# Patient Record
Sex: FEMALE | Race: WHITE | NOT HISPANIC OR LATINO | Employment: OTHER | URBAN - METROPOLITAN AREA
[De-identification: names, ages, dates, MRNs, and addresses within clinical notes are randomized per-mention and may not be internally consistent; named-entity substitution may affect disease eponyms.]

---

## 2023-03-21 PROBLEM — H55.09 HORIZONTAL NYSTAGMUS: Status: ACTIVE | Noted: 2023-03-21

## 2023-03-21 PROBLEM — R42 DIZZINESS: Status: ACTIVE | Noted: 2023-03-21

## 2023-03-21 PROBLEM — R92.1 BREAST CALCIFICATION, RIGHT: Status: ACTIVE | Noted: 2023-03-21

## 2023-03-21 PROBLEM — R42 VERTIGO: Status: ACTIVE | Noted: 2023-03-21

## 2023-03-21 PROBLEM — M25.562 LEFT KNEE PAIN: Status: ACTIVE | Noted: 2023-03-21

## 2023-03-21 PROBLEM — G43.909 MIGRAINES: Status: ACTIVE | Noted: 2023-03-21

## 2023-03-21 PROBLEM — N95.2 ATROPHIC VULVOVAGINITIS: Status: ACTIVE | Noted: 2023-03-21

## 2023-03-21 PROBLEM — M26.69 TMJ CREPITUS: Status: ACTIVE | Noted: 2023-03-21

## 2023-03-21 PROBLEM — H93.8X2 FULLNESS IN EAR, LEFT: Status: ACTIVE | Noted: 2023-03-21

## 2023-03-21 PROBLEM — F17.210 NICOTINE DEPENDENCE, CIGARETTES, UNCOMPLICATED: Status: ACTIVE | Noted: 2023-03-21

## 2023-03-21 PROBLEM — H92.02 OTALGIA, LEFT: Status: ACTIVE | Noted: 2023-03-21

## 2023-03-21 PROBLEM — K63.5 COLON POLYPS: Status: ACTIVE | Noted: 2023-03-21

## 2023-03-21 PROBLEM — R20.0 NUMBNESS AND TINGLING OF RIGHT HAND: Status: ACTIVE | Noted: 2023-03-21

## 2023-03-21 PROBLEM — E78.00 ELEVATED LOW DENSITY LIPOPROTEIN (LDL) CHOLESTEROL LEVEL: Status: ACTIVE | Noted: 2023-03-21

## 2023-03-21 PROBLEM — R11.0 NAUSEA: Status: ACTIVE | Noted: 2023-03-21

## 2023-03-21 PROBLEM — E78.00 HYPERCHOLESTEROLEMIA: Status: ACTIVE | Noted: 2023-03-21

## 2023-03-21 PROBLEM — R20.2 NUMBNESS AND TINGLING OF RIGHT HAND: Status: ACTIVE | Noted: 2023-03-21

## 2023-03-21 PROBLEM — H90.3 ASYMMETRICAL SENSORINEURAL HEARING LOSS: Status: ACTIVE | Noted: 2023-03-21

## 2023-03-21 PROBLEM — M11.262 CHONDROCALCINOSIS OF LEFT KNEE: Status: ACTIVE | Noted: 2023-03-21

## 2023-03-21 PROBLEM — N20.0 KIDNEY STONE: Status: ACTIVE | Noted: 2023-03-21

## 2023-03-21 PROBLEM — M85.80 OSTEOPENIA: Status: ACTIVE | Noted: 2023-03-21

## 2023-03-21 PROBLEM — G95.0: Status: ACTIVE | Noted: 2023-03-21

## 2023-03-21 PROBLEM — R00.2 PALPITATIONS: Status: ACTIVE | Noted: 2023-03-21

## 2023-03-21 PROBLEM — H81.8X2 UNILATERAL VESTIBULAR WEAKNESS, LEFT: Status: ACTIVE | Noted: 2023-03-21

## 2023-03-21 PROBLEM — M54.2 NECK PAIN: Status: ACTIVE | Noted: 2023-03-21

## 2023-03-21 PROBLEM — R79.89 ABNORMAL TSH: Status: ACTIVE | Noted: 2023-03-21

## 2023-03-21 PROBLEM — N76.1 DESQUAMATIVE INFLAMMATORY VAGINITIS: Status: ACTIVE | Noted: 2023-03-21

## 2023-03-21 PROBLEM — R92.8 ABNORMAL MAMMOGRAM: Status: ACTIVE | Noted: 2023-03-21

## 2023-03-21 PROBLEM — M76.51 PATELLAR TENDINITIS OF RIGHT KNEE: Status: ACTIVE | Noted: 2023-03-21

## 2023-03-21 PROBLEM — R91.1 INCIDENTAL LUNG NODULE: Status: ACTIVE | Noted: 2023-03-21

## 2023-03-21 PROBLEM — D69.6 THROMBOCYTOPENIA (CMS-HCC): Status: ACTIVE | Noted: 2023-03-21

## 2023-03-21 PROBLEM — R63.4 ABNORMAL WEIGHT LOSS: Status: ACTIVE | Noted: 2023-03-21

## 2023-03-21 PROBLEM — F41.9 ANXIETY: Status: ACTIVE | Noted: 2023-03-21

## 2023-03-21 PROBLEM — E55.9 VITAMIN D DEFICIENCY: Status: ACTIVE | Noted: 2023-03-21

## 2023-03-21 PROBLEM — E56.0 VITAMIN E DEFICIENCY: Status: ACTIVE | Noted: 2023-03-21

## 2023-03-21 PROBLEM — R31.29 OTHER MICROSCOPIC HEMATURIA: Status: ACTIVE | Noted: 2023-03-21

## 2023-03-21 PROBLEM — H55.00 NYSTAGMUS: Status: ACTIVE | Noted: 2023-03-21

## 2023-03-21 RX ORDER — SERTRALINE HYDROCHLORIDE 25 MG/1
1 TABLET, FILM COATED ORAL DAILY
COMMUNITY
Start: 2019-07-31 | End: 2023-11-20 | Stop reason: SDUPTHER

## 2023-03-21 RX ORDER — PNV NO.95/FERROUS FUM/FOLIC AC 28MG-0.8MG
1 TABLET ORAL DAILY
COMMUNITY
Start: 2019-07-24 | End: 2023-03-23

## 2023-03-21 RX ORDER — ATORVASTATIN CALCIUM 40 MG/1
1 TABLET, FILM COATED ORAL DAILY
COMMUNITY
Start: 2018-12-17 | End: 2023-09-28 | Stop reason: SDUPTHER

## 2023-03-21 RX ORDER — VIT C/E/ZN/COPPR/LUTEIN/ZEAXAN 250MG-90MG
1 CAPSULE ORAL DAILY
COMMUNITY

## 2023-03-21 RX ORDER — DOXEPIN HYDROCHLORIDE 10 MG/1
1-2 CAPSULE ORAL NIGHTLY PRN
COMMUNITY
Start: 2014-04-23 | End: 2023-03-23 | Stop reason: SDUPTHER

## 2023-03-21 RX ORDER — ESTRADIOL 0.1 MG/G
CREAM VAGINAL
COMMUNITY
End: 2023-03-23

## 2023-03-21 RX ORDER — ACETAMINOPHEN 500 MG
1 TABLET ORAL DAILY
COMMUNITY
End: 2023-03-23

## 2023-03-23 ENCOUNTER — OFFICE VISIT (OUTPATIENT)
Dept: PRIMARY CARE | Facility: CLINIC | Age: 63
End: 2023-03-23
Payer: COMMERCIAL

## 2023-03-23 ENCOUNTER — LAB (OUTPATIENT)
Dept: LAB | Facility: LAB | Age: 63
End: 2023-03-23
Payer: COMMERCIAL

## 2023-03-23 VITALS
SYSTOLIC BLOOD PRESSURE: 118 MMHG | WEIGHT: 154.6 LBS | HEIGHT: 66 IN | OXYGEN SATURATION: 98 % | TEMPERATURE: 97.5 F | HEART RATE: 72 BPM | DIASTOLIC BLOOD PRESSURE: 72 MMHG | BODY MASS INDEX: 24.85 KG/M2

## 2023-03-23 DIAGNOSIS — G43.809 OTHER MIGRAINE WITHOUT STATUS MIGRAINOSUS, NOT INTRACTABLE: ICD-10-CM

## 2023-03-23 DIAGNOSIS — R07.89 ATYPICAL CHEST PAIN: Primary | ICD-10-CM

## 2023-03-23 DIAGNOSIS — R79.89 ABNORMAL TSH: ICD-10-CM

## 2023-03-23 DIAGNOSIS — D69.6 THROMBOCYTOPENIA (CMS-HCC): ICD-10-CM

## 2023-03-23 DIAGNOSIS — E55.9 VITAMIN D DEFICIENCY: ICD-10-CM

## 2023-03-23 DIAGNOSIS — R07.89 ATYPICAL CHEST PAIN: ICD-10-CM

## 2023-03-23 DIAGNOSIS — F51.01 PRIMARY INSOMNIA: ICD-10-CM

## 2023-03-23 DIAGNOSIS — H69.92 DYSFUNCTION OF LEFT EUSTACHIAN TUBE: ICD-10-CM

## 2023-03-23 DIAGNOSIS — M94.0 COSTOCHONDRITIS: ICD-10-CM

## 2023-03-23 DIAGNOSIS — E78.00 HYPERCHOLESTEROLEMIA: ICD-10-CM

## 2023-03-23 LAB
ALANINE AMINOTRANSFERASE (SGPT) (U/L) IN SER/PLAS: 24 U/L (ref 7–45)
ALBUMIN (G/DL) IN SER/PLAS: 4.4 G/DL (ref 3.4–5)
ALKALINE PHOSPHATASE (U/L) IN SER/PLAS: 79 U/L (ref 33–136)
ANION GAP IN SER/PLAS: 8 MMOL/L (ref 10–20)
ASPARTATE AMINOTRANSFERASE (SGOT) (U/L) IN SER/PLAS: 24 U/L (ref 9–39)
BILIRUBIN TOTAL (MG/DL) IN SER/PLAS: 0.6 MG/DL (ref 0–1.2)
CALCIDIOL (25 OH VITAMIN D3) (NG/ML) IN SER/PLAS: 37 NG/ML
CALCIUM (MG/DL) IN SER/PLAS: 9.7 MG/DL (ref 8.6–10.6)
CARBON DIOXIDE, TOTAL (MMOL/L) IN SER/PLAS: 31 MMOL/L (ref 21–32)
CHLORIDE (MMOL/L) IN SER/PLAS: 107 MMOL/L (ref 98–107)
CHOLESTEROL (MG/DL) IN SER/PLAS: 151 MG/DL (ref 0–199)
CHOLESTEROL IN HDL (MG/DL) IN SER/PLAS: 83 MG/DL
CHOLESTEROL/HDL RATIO: 1.8
CREATINE KINASE (U/L) IN SER/PLAS: 90 U/L (ref 0–215)
CREATININE (MG/DL) IN SER/PLAS: 0.76 MG/DL (ref 0.5–1.05)
ERYTHROCYTE DISTRIBUTION WIDTH (RATIO) BY AUTOMATED COUNT: 14.5 % (ref 11.5–14.5)
ERYTHROCYTE MEAN CORPUSCULAR HEMOGLOBIN CONCENTRATION (G/DL) BY AUTOMATED: 31.1 G/DL (ref 32–36)
ERYTHROCYTE MEAN CORPUSCULAR VOLUME (FL) BY AUTOMATED COUNT: 93 FL (ref 80–100)
ERYTHROCYTES (10*6/UL) IN BLOOD BY AUTOMATED COUNT: 4.3 X10E12/L (ref 4–5.2)
GFR FEMALE: 89 ML/MIN/1.73M2
GLUCOSE (MG/DL) IN SER/PLAS: 88 MG/DL (ref 74–99)
HEMATOCRIT (%) IN BLOOD BY AUTOMATED COUNT: 39.9 % (ref 36–46)
HEMOGLOBIN (G/DL) IN BLOOD: 12.4 G/DL (ref 12–16)
LDL: 54 MG/DL (ref 0–99)
LEUKOCYTES (10*3/UL) IN BLOOD BY AUTOMATED COUNT: 4.1 X10E9/L (ref 4.4–11.3)
NRBC (PER 100 WBCS) BY AUTOMATED COUNT: 0 /100 WBC (ref 0–0)
PLATELETS (10*3/UL) IN BLOOD AUTOMATED COUNT: 132 X10E9/L (ref 150–450)
POTASSIUM (MMOL/L) IN SER/PLAS: 4 MMOL/L (ref 3.5–5.3)
PROTEIN TOTAL: 6.7 G/DL (ref 6.4–8.2)
SEDIMENTATION RATE, ERYTHROCYTE: 2 MM/H (ref 0–30)
SODIUM (MMOL/L) IN SER/PLAS: 142 MMOL/L (ref 136–145)
THYROTROPIN (MIU/L) IN SER/PLAS BY DETECTION LIMIT <= 0.05 MIU/L: 5.18 MIU/L (ref 0.44–3.98)
THYROXINE (T4) FREE (NG/DL) IN SER/PLAS: 1.09 NG/DL (ref 0.78–1.48)
TRIGLYCERIDE (MG/DL) IN SER/PLAS: 72 MG/DL (ref 0–149)
UREA NITROGEN (MG/DL) IN SER/PLAS: 14 MG/DL (ref 6–23)
VLDL: 14 MG/DL (ref 0–40)

## 2023-03-23 PROCEDURE — 1036F TOBACCO NON-USER: CPT | Performed by: FAMILY MEDICINE

## 2023-03-23 PROCEDURE — 82550 ASSAY OF CK (CPK): CPT

## 2023-03-23 PROCEDURE — 80053 COMPREHEN METABOLIC PANEL: CPT

## 2023-03-23 PROCEDURE — 36415 COLL VENOUS BLD VENIPUNCTURE: CPT

## 2023-03-23 PROCEDURE — 99215 OFFICE O/P EST HI 40 MIN: CPT | Performed by: FAMILY MEDICINE

## 2023-03-23 PROCEDURE — 85652 RBC SED RATE AUTOMATED: CPT

## 2023-03-23 PROCEDURE — 85027 COMPLETE CBC AUTOMATED: CPT

## 2023-03-23 PROCEDURE — 82306 VITAMIN D 25 HYDROXY: CPT

## 2023-03-23 PROCEDURE — 84439 ASSAY OF FREE THYROXINE: CPT

## 2023-03-23 PROCEDURE — 84443 ASSAY THYROID STIM HORMONE: CPT

## 2023-03-23 PROCEDURE — 80061 LIPID PANEL: CPT

## 2023-03-23 RX ORDER — CHOLECALCIFEROL (VITAMIN D3) 25 MCG
25 TABLET ORAL DAILY
COMMUNITY
End: 2023-09-07

## 2023-03-23 RX ORDER — IBUPROFEN 100 MG/1
TABLET, CHEWABLE ORAL EVERY 8 HOURS PRN
COMMUNITY

## 2023-03-23 RX ORDER — CYCLOSPORINE 0.5 MG/ML
1 EMULSION OPHTHALMIC 2 TIMES DAILY
COMMUNITY
Start: 2017-07-25 | End: 2023-03-23

## 2023-03-23 RX ORDER — DOXEPIN HYDROCHLORIDE 10 MG/1
10-20 CAPSULE ORAL NIGHTLY PRN
Qty: 180 CAPSULE | Refills: 3 | Status: SHIPPED | OUTPATIENT
Start: 2023-03-23 | End: 2023-03-23 | Stop reason: SDUPTHER

## 2023-03-23 RX ORDER — DOXEPIN HYDROCHLORIDE 10 MG/1
10-20 CAPSULE ORAL NIGHTLY PRN
Qty: 180 CAPSULE | Refills: 3 | Status: SHIPPED | OUTPATIENT
Start: 2023-03-23 | End: 2024-06-10

## 2023-03-23 RX ORDER — VITAMIN E MIXED 400 UNIT
CAPSULE ORAL DAILY
COMMUNITY

## 2023-03-23 ASSESSMENT — PAIN SCALES - GENERAL: PAINLEVEL: 4

## 2023-03-23 NOTE — PROGRESS NOTES
"Subjective   Patient ID: Willa Chandler is a 62 y.o. female who presents for Breast Pain (Left side underneath breast discomfort ), Medication Problem (Review medication possible bw; pt is fasting. ), and Ear Problem (Left ear concerns).    HPI patient complains that over the last 3 to 4 months she has had some chest pain along her sternum as well as her rib cage along the neck anterior bra line.  Pain is reproduced by palpation on the sternum.  She can also reproduce the chest and rib pain with rotating from side to side.  She feels fine when she exercises.  Patient complains of her ear feeling blocked as if there is fluid in it.  She has had minimal congestion.    Review of Systems    Objective   /72 (BP Location: Left arm, Patient Position: Sitting, BP Cuff Size: Adult)   Pulse 72   Temp 36.4 °C (97.5 °F) (Temporal)   Ht 1.676 m (5' 6\")   Wt 70.1 kg (154 lb 9.6 oz)   SpO2 98%   BMI 24.95 kg/m²     Physical Exam  Alert, pleasant and in no acute distress.  Heart: Regular rate and rhythm without murmur chest: There is tenderness along the costochondral margins of the lower half of the sternum.  There is no palpable tenderness of the rib cage.  Rib compression produces no pain.  Patient is able to reproduce her pain by rolling on the table.  Lungs: Clear to auscultation  Lower extremities: No edema  Ears: External auricle is normal appearing.  Canals are clear without debris or cerumen.  There is no erythema or swelling.  Tympanic membranes are pearly gray with good light reflexes.  Assessment/Plan   Problem List Items Addressed This Visit          Endocrine/Metabolic    Abnormal TSH    Relevant Orders    TSH with reflex to Free T4 if abnormal    Vitamin D deficiency    Relevant Orders    Vitamin D 25-Hydroxy,Total       Hematologic    Thrombocytopenia (CMS/HCC)    Relevant Orders    CBC       Other    Hypercholesterolemia    Relevant Orders    Lipid Panel    Migraines     Other Visit Diagnoses       " Atypical chest pain    -  Primary    Relevant Orders    Comprehensive Metabolic Panel    CBC    Sedimentation Rate    CK    Costochondritis        Primary insomnia        Dysfunction of left eustachian tube            Patient with atypical chest pain.  She has signs of a musculoskeletal problem.  She has some costochondritis.  Because of the extended nature of this over the last 3 to 4 months, we will get some lab work to be sure there is no other underlying pathology.  CBC, ESR and CPK ordered.  Of note, patient did have a CT of her lungs in September for lung cancer screening.  There were no significant abnormalities noted that would relate to her discomfort.  Patient with fluid sensation in her ear.  Likely related to a eustachian tube dysfunction.  But this only being for a few days, recommend giving it time to heal.  She is to call if there is any progression or worsening of symptoms.  With history of hypothyroidism we will recheck her TSH.  Also, with history of migraines and insomnia, her doxepin was refilled.  Advised her to increase the dose to 2 every night as she has not been sleeping well and this may bring her better relief.  We will call in 3 to 5 days with lab results.

## 2023-09-07 ENCOUNTER — OFFICE VISIT (OUTPATIENT)
Dept: PRIMARY CARE | Facility: CLINIC | Age: 63
End: 2023-09-07
Payer: COMMERCIAL

## 2023-09-07 VITALS
WEIGHT: 156.6 LBS | BODY MASS INDEX: 25.17 KG/M2 | DIASTOLIC BLOOD PRESSURE: 74 MMHG | HEIGHT: 66 IN | SYSTOLIC BLOOD PRESSURE: 120 MMHG

## 2023-09-07 DIAGNOSIS — T63.441A ALLERGIC REACTION TO BEE STING: Primary | ICD-10-CM

## 2023-09-07 PROCEDURE — 1036F TOBACCO NON-USER: CPT | Performed by: FAMILY MEDICINE

## 2023-09-07 PROCEDURE — 99213 OFFICE O/P EST LOW 20 MIN: CPT | Performed by: FAMILY MEDICINE

## 2023-09-07 RX ORDER — CLOBETASOL PROPIONATE 0.5 MG/G
CREAM TOPICAL 2 TIMES DAILY PRN
Qty: 15 G | Refills: 0 | Status: SHIPPED | OUTPATIENT
Start: 2023-09-07 | End: 2023-09-08 | Stop reason: SDUPTHER

## 2023-09-07 ASSESSMENT — ENCOUNTER SYMPTOMS: DEPRESSION: 0

## 2023-09-07 ASSESSMENT — PAIN SCALES - GENERAL: PAINLEVEL: 0-NO PAIN

## 2023-09-08 ENCOUNTER — TELEPHONE (OUTPATIENT)
Dept: PRIMARY CARE | Facility: CLINIC | Age: 63
End: 2023-09-08
Payer: COMMERCIAL

## 2023-09-08 DIAGNOSIS — T63.441A ALLERGIC REACTION TO BEE STING: ICD-10-CM

## 2023-09-08 RX ORDER — CLOBETASOL PROPIONATE 0.5 MG/G
CREAM TOPICAL 2 TIMES DAILY PRN
Qty: 15 G | Refills: 0 | Status: SHIPPED | OUTPATIENT
Start: 2023-09-08

## 2023-09-08 RX ORDER — TRIAMCINOLONE ACETONIDE 1 MG/G
CREAM TOPICAL 3 TIMES DAILY PRN
Qty: 15 G | Refills: 0 | Status: SHIPPED | OUTPATIENT
Start: 2023-09-08

## 2023-09-08 NOTE — TELEPHONE ENCOUNTER
Pt called and states that [facility] is not able to fill the prescription due to [facility]. Pt would like to know if there is a way to re-send the prescription of clobetasol to [facility] again.

## 2023-09-08 NOTE — PROGRESS NOTES
"Subjective   Patient ID: Willa Chandler is a 62 y.o. female who presents for Insect Bite (Bee sting on inner left thigh 4 days ago; sharp pain and redness. ) and Eye Problem (Left eye concerns; irritation for 24 hrs. ).    HPI   Patient here for evaluation of insect sting.  Patient was working in the yard and was stung on her upper inner thigh 4 days ago.  She continues have quite a bit of burning and sharp stinging pains.  She has been using topical hydrocortisone.  She also took an oral Benadryl.  Review of Systems    Objective   /74 (BP Location: Left arm, Patient Position: Sitting, BP Cuff Size: Adult)   Ht 1.676 m (5' 6\")   Wt 71 kg (156 lb 9.6 oz)   BMI 25.28 kg/m²     Physical Exam  Alert, pleasant and in no acute distress.  Left medial thigh: There is a 2 inch area of erythema and irritation on the left medial upper thigh.  No swelling or induration.  Assessment/Plan   Problem List Items Addressed This Visit    None  Visit Diagnoses       Allergic reaction to bee sting    -  Primary    Relevant Medications    clobetasol (Temovate) 0.05 % cream        Patient with local reaction to a bee sting.  Clobetasol cream provided.  Safety and use of cortisone creams discussed.  Patient to call office if there is any worsening or progression.       "

## 2023-09-28 DIAGNOSIS — E78.00 HYPERCHOLESTEROLEMIA: Primary | ICD-10-CM

## 2023-09-28 RX ORDER — ATORVASTATIN CALCIUM 40 MG/1
40 TABLET, FILM COATED ORAL DAILY
Qty: 90 TABLET | Refills: 3 | Status: SHIPPED | OUTPATIENT
Start: 2023-09-28

## 2023-11-20 DIAGNOSIS — R91.8 PULMONARY NODULES: ICD-10-CM

## 2023-11-20 DIAGNOSIS — F41.9 ANXIETY: Primary | ICD-10-CM

## 2023-11-20 DIAGNOSIS — F17.210 NICOTINE DEPENDENCE, CIGARETTES, UNCOMPLICATED: ICD-10-CM

## 2023-11-20 RX ORDER — SERTRALINE HYDROCHLORIDE 25 MG/1
25 TABLET, FILM COATED ORAL DAILY
Qty: 90 TABLET | Refills: 3 | Status: SHIPPED | OUTPATIENT
Start: 2023-11-20 | End: 2024-11-19

## 2023-12-15 ENCOUNTER — APPOINTMENT (OUTPATIENT)
Dept: RADIOLOGY | Facility: CLINIC | Age: 63
End: 2023-12-15
Payer: COMMERCIAL

## 2024-01-31 ASSESSMENT — ENCOUNTER SYMPTOMS
SORE THROAT: 0
HEMATURIA: 0
BACK PAIN: 0
DYSURIA: 0
HEADACHES: 0
DIARRHEA: 0
FLANK PAIN: 0
ANOREXIA: 0
ABDOMINAL PAIN: 0
NAUSEA: 0
CONSTIPATION: 0
FREQUENCY: 0
FEVER: 0
CHILLS: 0
VOMITING: 0

## 2024-02-02 ENCOUNTER — OFFICE VISIT (OUTPATIENT)
Dept: OBSTETRICS AND GYNECOLOGY | Facility: CLINIC | Age: 64
End: 2024-02-02
Payer: COMMERCIAL

## 2024-02-02 VITALS
SYSTOLIC BLOOD PRESSURE: 112 MMHG | WEIGHT: 158 LBS | DIASTOLIC BLOOD PRESSURE: 62 MMHG | HEIGHT: 66 IN | BODY MASS INDEX: 25.39 KG/M2

## 2024-02-02 DIAGNOSIS — Z01.419 WELL WOMAN EXAM WITH ROUTINE GYNECOLOGICAL EXAM: ICD-10-CM

## 2024-02-02 DIAGNOSIS — Z12.31 BREAST CANCER SCREENING BY MAMMOGRAM: ICD-10-CM

## 2024-02-02 DIAGNOSIS — R32 INCONTINENCE IN FEMALE: Primary | ICD-10-CM

## 2024-02-02 PROCEDURE — 88175 CYTOPATH C/V AUTO FLUID REDO: CPT

## 2024-02-02 PROCEDURE — 99396 PREV VISIT EST AGE 40-64: CPT | Performed by: OBSTETRICS & GYNECOLOGY

## 2024-02-02 PROCEDURE — 1036F TOBACCO NON-USER: CPT | Performed by: OBSTETRICS & GYNECOLOGY

## 2024-02-02 NOTE — PROGRESS NOTES
Subjective   Patient ID: Willa Chandler is a 63 y.o. female who presents for Well Women Visit (Annual exam with pap smear and mammogram order//C/o  possible urinary incontinence/- has some leaking on a panty liner daily//Chaperone declined).  HPI  As contained in the chief complaint  Review of Systems  Please see HPI for reported pertinent positives, which would supersede this ROS    Constitutional: No chills, no fever and no night sweats, weight gain or loss, fatigue    Genitourinary: Denies genital lesion or sores, vaginal dryness, itching or pain.  No abnormal vaginal discharge or unexplained vaginal bleeding.  No dysuria, occasional urinary incontinence or frequency.  Denies pelvic pain, dysmenorrhea or dyspareunia    Eyes: Denies vision changes, dryness  ENT: No hearing loss, sinus pain or congestion,nosebleeds.  No hoarseness and no sore throat  Neck: No masses and no swelling  Cardiovascular: No chest pain, no intermittent leg claudication, no lower extremity edema, no palpitations and no syncope  Respiratory: No shortness of breath, cough, wheezing  Gastrointestinal: Denies nausea, vomiting, diarrhea, constipation, abdominal pain  Musculoskeletal: No new back pain joint pain, peripheral edema  Skin: No rash or skin lesion  Neurologic: No headache or dizziness.  No limb weakness, no tingling and no numbness  Psychiatric: No new anxiety or depression, no anhedonia and no substance use disorders  Hematologic/lymphatic: No swollen lymph nodes.  No reported tendency for easy bruising or bleeding new  Endocrine: No loss of hair or hirsutism    Patient reports no other systemic complaints.    Prior notes in a EMR reviewed.    Objective   Physical Exam  Vital signs reviewed    CONSTITUTIONAL- well nourished, well developed, looks like stated age.  She is sitting comfortably on the exam table in no apparent distress  SKIN- normal skin color and pigmentation no visible lesions  EYES- normal external exam  THYROID-  symmetrical ,normal size and normal consistency  HEART- RRR without murmur, S3 or S4.  LUNGS- breathing comfortably, no dyspnea  EXTREMITIES- no deformities  NEUROLOGICAL- oriented and no focal signs.  Cranial nerves II through XII intact  PSYCHIATRIC- alert, pleasant and cordial, age-appropriate, not anxious, or depressed appearing  BREASTS-normal appearance, no skin changes or nipple discharge.  Palpation of the breast and axillae: No palpable mass and no axillary lymphadenopathy  ABDOMEN- soft, non distended, bowel sound normal pitch and intensity,no palpable abnormal masses  GENITOURINARY- External genitalia: Normal.                                 - Uterus: AV/AF NSSC, mobile and nontender                                -The adnexal areas are free of tenderness or mass                                -There are no cervical lesions; there is no cervical motion tenderness                                -Vagina without lesions, mucosa pink and well-hydrated, discharge is physiologic.                                   Inspection of Perianal Area: Normal    Assessment/Plan   Diagnoses and all orders for this visit:  Incontinence in female  Well woman exam with routine gynecological exam  Breast cancer screening by mammogram  -We discussed the use of adrenergic agonist and muscarinic antagonist therapy versus surgery.  For now Willa would like to hold off on any event and follow her clinical symptoms.  She was instructed to call the office should her urinary symptoms increase.       Preet Johnson DO 02/02/24 10:03 AM

## 2024-02-15 LAB
CYTOLOGY CMNT CVX/VAG CYTO-IMP: NORMAL
LAB AP CONTRACEPTIVE HISTORY: NORMAL
LAB AP HPV GENOTYPE QUESTION: YES
LAB AP HPV HR: NORMAL
LABORATORY COMMENT REPORT: NORMAL
MENSTRUAL HX REPORTED: NORMAL
PATH REPORT.TOTAL CANCER: NORMAL

## 2024-03-20 ENCOUNTER — HOSPITAL ENCOUNTER (OUTPATIENT)
Dept: RADIOLOGY | Facility: CLINIC | Age: 64
Discharge: HOME | End: 2024-03-20
Payer: COMMERCIAL

## 2024-03-20 DIAGNOSIS — Z12.31 BREAST CANCER SCREENING BY MAMMOGRAM: ICD-10-CM

## 2024-03-20 PROCEDURE — 77067 SCR MAMMO BI INCL CAD: CPT | Mod: BILATERAL PROCEDURE | Performed by: RADIOLOGY

## 2024-03-20 PROCEDURE — 77067 SCR MAMMO BI INCL CAD: CPT

## 2024-03-20 PROCEDURE — 77063 BREAST TOMOSYNTHESIS BI: CPT | Mod: BILATERAL PROCEDURE | Performed by: RADIOLOGY

## 2024-06-10 DIAGNOSIS — G43.809 OTHER MIGRAINE WITHOUT STATUS MIGRAINOSUS, NOT INTRACTABLE: ICD-10-CM

## 2024-06-10 DIAGNOSIS — F51.01 PRIMARY INSOMNIA: ICD-10-CM

## 2024-06-10 PROBLEM — R32 INCONTINENCE: Status: ACTIVE | Noted: 2024-06-10

## 2024-06-10 PROBLEM — Z86.39 HISTORY OF HYPERCHOLESTEROLEMIA: Status: ACTIVE | Noted: 2024-06-10

## 2024-06-10 PROBLEM — Z86.69 HISTORY OF MIGRAINE: Status: ACTIVE | Noted: 2024-06-10

## 2024-06-10 PROBLEM — M11.20 CALCIUM PYROPHOSPHATE DEPOSITION DISEASE (CPPD): Status: ACTIVE | Noted: 2023-03-21

## 2024-06-10 PROBLEM — M25.511 PAIN OF RIGHT SHOULDER REGION: Status: ACTIVE | Noted: 2024-06-10

## 2024-06-10 PROBLEM — M35.01 KERATITIS SICCA, BILATERAL (MULTI): Status: ACTIVE | Noted: 2019-10-29

## 2024-06-10 PROBLEM — Z85.820 HISTORY OF MALIGNANT MELANOMA: Status: ACTIVE | Noted: 2024-06-10

## 2024-06-10 PROBLEM — R53.83 MALAISE AND FATIGUE: Status: ACTIVE | Noted: 2024-06-10

## 2024-06-10 PROBLEM — M50.30 DEGENERATION OF INTERVERTEBRAL DISC OF CERVICAL REGION: Status: ACTIVE | Noted: 2024-06-10

## 2024-06-10 PROBLEM — R13.10 DYSPHAGIA: Status: ACTIVE | Noted: 2024-06-10

## 2024-06-10 PROBLEM — T63.441A ALLERGIC REACTION TO BEE STING: Status: ACTIVE | Noted: 2024-06-10

## 2024-06-10 PROBLEM — R53.81 MALAISE AND FATIGUE: Status: ACTIVE | Noted: 2024-06-10

## 2024-06-10 PROBLEM — S83.90XA SPRAIN OF KNEE: Status: ACTIVE | Noted: 2024-06-10

## 2024-06-10 PROBLEM — H16.223 KERATITIS SICCA, BILATERAL: Status: ACTIVE | Noted: 2019-10-29

## 2024-06-10 PROBLEM — E83.59 CALCIUM OXALATE CALCULUS: Status: ACTIVE | Noted: 2024-06-10

## 2024-06-10 RX ORDER — CLINDAMYCIN HYDROCHLORIDE 150 MG/1
150 CAPSULE ORAL EVERY 6 HOURS
COMMUNITY
Start: 2024-06-05

## 2024-06-10 RX ORDER — ESTRADIOL 0.1 MG/G
CREAM VAGINAL
COMMUNITY
Start: 2023-01-30

## 2024-06-10 RX ORDER — DOXEPIN HYDROCHLORIDE 10 MG/1
CAPSULE ORAL
Qty: 180 CAPSULE | Refills: 2 | Status: SHIPPED | OUTPATIENT
Start: 2024-06-10

## 2024-09-16 DIAGNOSIS — E78.00 HYPERCHOLESTEROLEMIA: ICD-10-CM

## 2024-09-16 RX ORDER — ATORVASTATIN CALCIUM 40 MG/1
40 TABLET, FILM COATED ORAL DAILY
Qty: 90 TABLET | Refills: 0 | Status: SHIPPED | OUTPATIENT
Start: 2024-09-16

## 2024-11-04 DIAGNOSIS — F41.9 ANXIETY: ICD-10-CM

## 2024-11-04 RX ORDER — SERTRALINE HYDROCHLORIDE 25 MG/1
25 TABLET, FILM COATED ORAL DAILY
Qty: 90 TABLET | Refills: 3 | Status: SHIPPED | OUTPATIENT
Start: 2024-11-04

## 2024-12-16 DIAGNOSIS — E78.00 HYPERCHOLESTEROLEMIA: ICD-10-CM

## 2024-12-16 RX ORDER — ATORVASTATIN CALCIUM 40 MG/1
40 TABLET, FILM COATED ORAL DAILY
Qty: 90 TABLET | Refills: 3 | Status: SHIPPED | OUTPATIENT
Start: 2024-12-16

## 2025-09-17 ENCOUNTER — APPOINTMENT (OUTPATIENT)
Dept: OBSTETRICS AND GYNECOLOGY | Facility: CLINIC | Age: 65
End: 2025-09-17